# Patient Record
Sex: FEMALE | Race: WHITE
[De-identification: names, ages, dates, MRNs, and addresses within clinical notes are randomized per-mention and may not be internally consistent; named-entity substitution may affect disease eponyms.]

---

## 2019-07-01 ENCOUNTER — HOSPITAL ENCOUNTER (EMERGENCY)
Dept: HOSPITAL 97 - ER | Age: 64
Discharge: LEFT BEFORE DISCHARGE | End: 2019-07-01
Payer: COMMERCIAL

## 2019-07-01 DIAGNOSIS — Z53.21: Primary | ICD-10-CM

## 2019-07-01 PROCEDURE — 82962 GLUCOSE BLOOD TEST: CPT

## 2019-07-01 NOTE — XMS REPORT
Clinical Summary

 Created on:2019



Patient:Bessy Ryan

Sex:Female

:1955

External Reference #:[id number]





Demographics







 Address  [address] [place] [address]

 

 Home Phone  [phone]

 

 Preferred Language  English

 

 Marital Status  

 

 Baptism Affiliation  Unknown

 

 Race  

 

 Ethnic Group  Not  or 









Author







 Organization  [facility]

 

 Address  [address] [place] [address]









Support







 Name  Relationship  Address  Phone

 

 Autumn Ryan  Unavailable  Unavailable  [phone]

 

 Dean Forrester  Child  Unavailable  [phone]









Care Team Providers







 Name  Role  Phone

 

 Asked, No Pcp  Primary Care Provider  Unavailable









Allergies

No Known Allergies



Medications







 Medication  Sig  Dispensed  Refills  Start Date  End Date  Status

 

 albuterol (PROAIR  Inhale 2 puffs  1 Inhaler  0  2018  




 HFA,PROVENTIL  every 4 (four)          



 HFA,VENTOLIN HFA) 90  hours as needed          



 mcg/actuation  for wheezing for          



 inhaler  up to 30 days.          

 

 ondansetron (ZOFRAN)  Take 1 tablet (4  15 tablet  0  2018  




 4 MG tablet  mg total) by          



   mouth every 8          



   (eight) hours as          



   needed for nausea          



   for up to 5 days.          







Active Problems

Not on file



Encounters







 Date  Type  Specialty  Care Team  Description

 

 2018  Emergency  Emergency Medicine  Obi, Ann  Bronchitis (Primary Dx
)



       MD Liza  



after 2018



Social History







 Tobacco Use  Types  Packs/Day  Years Used  Date

 

 Current Some Day Smoker        









 Alcohol Use  Drinks/Week  oz/Week  Comments

 

 No      









 Sex Assigned at Birth  Date Recorded

 

 Not on file  









 Job Start Date  Occupation  Industry

 

 Not on file  Not on file  Not on file









 Travel History  Travel Start  Travel End









 No recent travel history available.







Last Filed Vital Signs







 Vital Sign  Reading  Time Taken

 

 Blood Pressure  133/71  2018  9:28 AM CST

 

 Pulse  78  2018  9:28 AM CST

 

 Temperature  36.3 C (97.4 F)  2018  5:40 AM CST

 

 Respiratory Rate  18  2018  9:28 AM CST

 

 Oxygen Saturation  98%  2018  9:28 AM CST

 

 Inhaled Oxygen Concentration  -  -

 

 Weight  72.6 kg (160 lb)  2018  5:40 AM CST

 

 Height  165.1 cm (5' 5")  2018  5:40 AM CST

 

 Body Mass Index  26.63  2018  5:40 AM CST







Plan of Treatment







 Health Maintenance  Due Date  Last Done  Comments

 

 BREAST CANCER SCREENING  2005    

 

 COLONOSCOPY SCREENING  2005    

 

 SHINGLES VACCINES (#1)  2005    

 

 INFLUENZA VACCINE  2019    







Procedures







 Procedure Name  Priority  Date/Time  Associated  Comments



       Diagnosis  

 

 ECG ED PRELIMINARY  Routine  2018  9:16    Results for this



 INTERPRETATION    AM CST    procedure are in



         the results



         section.

 

 ECG 12-LEAD  STAT  2018  6:56    Results for this



     AM CST    procedure are in



         the results



         section.

 

 XR CHEST 2 VW  STAT  2018  6:41    Results for this



     AM CST    procedure are in



         the results



         section.

 

 ESTIMATED GFR  STAT  2018  5:50    Results for this



     AM CST    procedure are in



         the results



         section.

 

 B NATRIURETIC PEPTIDE  STAT  2018  5:50    Results for this



     AM CST    procedure are in



         the results



         section.

 

 TROPONIN  STAT  2018  5:50    Results for this



     AM CST    procedure are in



         the results



         section.

 

 CREATINE KINASE, TOTAL  STAT  2018  5:50    Results for this



 (CPK)    AM CST    procedure are in



         the results



         section.

 

 COMPREHENSIVE METABOLIC  STAT  2018  5:50    Results for this



 PANEL    AM CST    procedure are in



         the results



         section.

 

 HC COMPLETE BLD COUNT  STAT  2018  5:50    Results for this



 W/AUTO DIFF    AM CST    procedure are in



         the results



         section.



after 2018



Results

ECG ED Preliminary Interpretation - NOT AN ORDER (2018  9:16 AM CST)





 Narrative  Performed At

 

 [place] [address]:31 AM



  



 ECG ED Preliminary Interpretation - Not an Order



  



 Performed by: ANN HELTON



  



 Authorized by: ANN HELTON 



  



  



  



 ECG reviewed by ED Physician in the absence of a cardiologist: yes



  



 Previous ECG: 



  



 Previous ECG:Unavailable



  



 Interpretation: 



  



 Interpretation: abnormal



  



 Rate: 



  



 ECG rate:78 bpm



  



 ECG rate assessment: normal



  



 Rhythm: 



  



 Rhythm: sinus rhythm



  



 Ectopy: 



  



 Ectopy: none



  



 QRS: 



  



 QRS axis:Left



  



 QRS intervals:Normal (106 ms)



  



 Conduction: 



  



 Conduction: abnormal



  



 Abnormal conduction: LAFB



  



 ST segments: 



  



 ST segments:Non-specific



  



 T waves: 



  



 T waves: normal



  



 Other findings: 



  



 Other findings: LVH



  



 Comments: 



  



  NV Interval: 166 ms



  



 QT/QTc: 428/487 ms



  



   



ECG 12 lead (2018  6:56 AM CST)





 Component  Value  Ref Range  Performed At  Pathologist Signature

 

 Ventricular rate  78    [facility]  

 

 Atrial rate  78    [facility]  

 

 NV interval  166    [facility]  

 

 QRSD interval  106    [facility]  

 

 QT interval  428    [facility]  

 

 QTC interval  487    [facility]  

 

 P axis 1  48    [facility]  

 

 QRS axis 1  -51    [facility]  

 

 T wave axis  59    [facility]  

 

 EKG impression  Normal sinus rhythm-Left anterior fascicular block-Moderate 
voltage criteria for LVH, may be normal variant-Abnormal ECG-In automated 
comparison with ECG of 13-MAR-2014 20:54,-Incomplete right bundle branch block 
is no longer present-Electronically    [facility]  



   Signed By Carrie Seymour ([id number]) on 2018 7:25:53 PM      



         









 Specimen

 

 









 Performing Organization  Address  [facility]  Phone Number

 

 [facility]  [address] [place] [address]  



XR Chest 2 Vw (2018  6:41 AM CST)





 Specimen

 

 









 Narrative  Performed At

 

 EXAMINATION:XR CHEST 2 VW



   RADIANT



  



  



 CLINICAL HISTORY:persistent coughr o pneumonia



  



  



  



 COMPARISON:To previous examination from 3/13/2014



  



  



  



 IMPRESSION:



  



  



  



 The heart is normal in appearance, and the lungs are clear.A moderate  



 hiatal hernia is again noted.



  



  



  



  



  



  



  



 [facility]-[id number]



  



   









 Procedure Note

 

 Hm Interface, Radiology Results Incoming - 2018  6:47 AM CST



EXAMINATION:  XR CHEST 2 VW



 



 CLINICAL HISTORY:  persistent cough  r o pneumonia



 



 COMPARISON:  To previous examination from 3/13/2014



 



 IMPRESSION:



 



 The heart is normal in appearance, and the lungs are clear.A moderate hiatal 
hernia is again noted.



 



 



 



 [facility]-[id number]









 Performing Organization  Address  City[facility]  Phone Number

 

 [facility]  [address] [place] [address]  



Estimated GFR (2018  5:50 AM CST)





 Component  Value  Ref Range  Performed At  Pathologist



         Signature

 

 Estimated GFR  >=90  mL/min/1.73  [facility] DEPARTMENT OF  



   Comment:  m2  PATHOLOGY AND  



   CatergoryUnitsInterpretation    GENOMIC MEDICINE  



   G1 >=90 Normal or high      



   G2 60-89Mildly decreased      



   [id number] to moderately decreased      



   [id number] to severely decreased      



   G4 15-29Severely decreased      



   G5 <15Kidney failure      



   The eGFR was calculated using the Chronic Kidney Disease      



   Epidemiology Collaboration (CKD-EPI) equation.      



   Interpretation is based on recommendations of the      



   National Kidney Foundation-Kidney Disease Outcomes Quality      



   Initiative (NKF-KDOQI) published in 2014.      



         









 Specimen

 

 Plasma specimen









 Performing Organization  Address  City/State/Zipcode  Phone Number

 

 [facility] DEPARTMENT OF PATHOLOGY AND  [address]  [place] [id number]  



 [facility]      



Troponin (2018  5:50 AM CST)





 Component  Value  Ref Range  Performed At  Pathologist



         Signature

 

 Troponin  <0.10  0.00 - 0.10  [facility] DEPARTMENT OF  



   Comment:  ng/mL  PATHOLOGY AND  



   0.11 - 1.49 ng/mlMay indicate increased risk of acute
    GENOMIC MEDICINE  



    coronary syndrome.
      



   >=1.5 ng/mlConsistent with acute myocardial      



    infarction.      



   
      



   The diagnostic value of a single normal or non-diagnostic      



   result is questionable.Serial samples at 2-6 hour intervals      



   are required to rule out acute myocardial injury.
      



         



         









 Specimen

 

 Plasma specimen









 Performing Organization  Address  City/[facility]  Phone Number

 

 [facility] DEPARTMENT OF PATHOLOGY AND  [address]  [place] [phone]  



 [facility]      



CBC with platelet and differential (2018  5:50 AM CST)





 Component  Value  Ref Range  Performed At  Pathologist



         Signature

 

 WBC  6.79  4.50 - 11.00  [facility] DEPARTMENT OF  



     k/uL  PATHOLOGY AND  



       GENOMIC MEDICINE  

 

 RBC  4.18 (L)  4.20 - 5.50  [facility] DEPARTMENT OF  



     m/uL  PATHOLOGY AND  



       GENOMIC MEDICINE  

 

 HGB  13.4  12.0 - 16.0  [facility] DEPARTMENT OF  



     g/dL  PATHOLOGY AND  



       GENOMIC MEDICINE  

 

 HCT  39.3  37.0 - 47.0 %  [facility] DEPARTMENT OF  



       PATHOLOGY AND  



       GENOMIC MEDICINE  

 

 MCV  94.0  82.0 - 100.0 fL  [facility] DEPARTMENT OF  



       PATHOLOGY AND  



       GENOMIC MEDICINE  

 

 MCH  32.1  27.0 - 34.0 pg  [facility] DEPARTMENT OF  



       PATHOLOGY AND  



       GENOMIC MEDICINE  

 

 MCHC  34.1  31.0 - 37.0  [facility] DEPARTMENT OF  



     g/dL  PATHOLOGY AND  



       GENOMIC MEDICINE  

 

 RDW - SD  43.2  37.0 - 55.0 fL  [facility] DEPARTMENT OF  



       PATHOLOGY AND  



       GENOMIC MEDICINE  

 

 MPV  10.3  8.8 - 13.2 fL  [facility] DEPARTMENT OF  



       PATHOLOGY AND  



       GENOMIC MEDICINE  

 

 Platelet count  302  150 - 400 k/uL  [facility] DEPARTMENT OF  



       PATHOLOGY AND  



       GENOMIC MEDICINE  

 

 Neutrophils  63.6  39.0 - 69.0 %  [facility] DEPARTMENT OF  



       PATHOLOGY AND  



       GENOMIC MEDICINE  

 

 Lymphocytes  24.0 (L)  25.0 - 45.0 %  [facility] DEPARTMENT OF  



       PATHOLOGY AND  



       GENOMIC MEDICINE  

 

 Monocytes  10.3 (H)  0.0 - 10.0 %  [facility] DEPARTMENT OF  



       PATHOLOGY AND  



       GENOMIC MEDICINE  

 

 Eosinophils  1.2  0.0 - 5.0 %  [facility] DEPARTMENT OF  



       PATHOLOGY AND  



       GENOMIC MEDICINE  

 

 Basophils  0.6  0.0 - 1.0 %  [facility] DEPARTMENT OF  



       PATHOLOGY AND  



       GENOMIC MEDICINE  

 

 Immature granulocytes  0.3  0.0 - 1.0 %  [facility] DEPARTMENT OF  



       PATHOLOGY AND  



       GENOMIC MEDICINE  









 Specimen

 

 Blood









 Performing Organization  Address  City/[facility]  Phone Number

 

 [facility] DEPARTMENT OF PATHOLOGY AND  [address] [address] [facility]  [address]      



B natriuretic peptide (2018  5:50 AM CST)





 Component  Value  Ref Range  Performed At  Pathologist Signature

 

 BNP  13  0 - 100 pg/mL  [facility] DEPARTMENT OF PATHOLOGY AND  



       GENOMIC MEDICINE  









 Specimen

 

 Blood









 Performing Organization  Address  City/[facility]  Phone Number

 

 [facility] DEPARTMENT OF PATHOLOGY AND  [address] [address] [facility] [facility].  [address]      



Creatine kinase, total (CPK) (2018  5:50 AM CST)





 Component  Value  Ref Range  Performed At  Pathologist Signature

 

 Creatine kinase  75  35 - 200 U/L  [facility] DEPARTMENT OF PATHOLOGY  



       AND GENOMIC MEDICINE  









 Specimen

 

 Plasma specimen









 Performing Organization  Address  City/[facility]  Phone Number

 

 [facility] DEPARTMENT OF PATHOLOGY AND  [address] [address] [facility] [facility].  [address]      



Comprehensive metabolic panel (2018  5:50 AM CST)





 Component  Value  Ref Range  Performed At  Pathologist



         Signature

 

 Sodium  138  135 - 148  [facility] DEPARTMENT OF  



     mEq/L  PATHOLOGY AND  



       GENOMIC MEDICINE  

 

 Potassium  3.0 (LL)  3.5 - 5.0  [facility] DEPARTMENT OF  



   Comment:  mEq/L  PATHOLOGY AND  



   POTASSIUM results called to and read back by ALISSA UMANA/KARLENE 2018    GENOMIC MEDICINE  



   08:54 by JUDY.      



         

 

 Chloride  98 (L)  99 - 109  [facility] DEPARTMENT OF  



     mEq/L  PATHOLOGY AND  



       GENOMIC MEDICINE  

 

 CO2  24  24 - 31 mEq/L  [facility] DEPARTMENT OF  



       PATHOLOGY AND  



       GENOMIC MEDICINE  

 

 Anion gap  16@ANIO (H)  7 - 15 mEq/L  [facility] DEPARTMENT OF  



       PATHOLOGY AND  



       GENOMIC MEDICINE  

 

 BUN  24  8 - 24 mg/dL  [facility] DEPARTMENT OF  



       PATHOLOGY AND  



       GENOMIC MEDICINE  

 

 Creatinine  0.54  0.50 - 0.90  [facility] DEPARTMENT OF  



     mg/dL  PATHOLOGY AND  



       GENOMIC MEDICINE  

 

 Glucose  165 (H)  65 - 99 mg/dL  [facility] DEPARTMENT OF  



       PATHOLOGY AND  



       GENOMIC MEDICINE  

 

 Calcium  9.1  8.6 - 10.6  [facility] DEPARTMENT OF  



     mg/dL  PATHOLOGY AND  



       GENOMIC MEDICINE  

 

 Protein  7.1  6.3 - 8.2  [facility] DEPARTMENT OF  



     g/dL  PATHOLOGY AND  



       GENOMIC MEDICINE  

 

 Albumin  3.6  3.5 - 5.0  [facility] DEPARTMENT OF  



     g/dL  PATHOLOGY AND  



       GENOMIC MEDICINE  

 

 A/G ratio  1.0  0.7 - 3.8  [facility] DEPARTMENT OF  



       PATHOLOGY AND  



       GENOMIC MEDICINE  

 

 Alkaline  79  30 - 115 U/L  [facility] DEPARTMENT OF  



 phosphatase      PATHOLOGY AND  



       GENOMIC MEDICINE  

 

 AST  29  15 - 46 U/L  [facility] DEPARTMENT OF  



       PATHOLOGY AND  



       GENOMIC MEDICINE  

 

 ALT  39  10 - 55 U/L  [facility] DEPARTMENT OF  



       PATHOLOGY AND  



       GENOMIC MEDICINE  

 

 Total bilirubin  0.5  0.2 - 1.2  [facility] DEPARTMENT OF  



     mg/dL  PATHOLOGY AND  



       GENOMIC MEDICINE  









 Specimen

 

 Plasma specimen









 Performing Organization  Address  City/State/[facility]  Phone Number

 

 [facility] DEPARTMENT OF PATHOLOGY AND  [address] [place] [address]  



 GENOMIC MEDICINE      



after 2018



Insurance







 Payer  Benefit Plan / Group  Subscriber ID  Effective Dates  Phone  Address  
Type

 

 [employer] [place]  xxxxxxxxx  2018-Present      O



   [employer]          









 Guarantor Name  Account Type  Relation to  Date of Birth  Phone  Billing



     Patient      Address

 

 Bessy Ryan  Personal/Family  Self  1955  [phone] [address]



         (Home)  [place] [address]







Advance Directives

Patient has advance care planning documents on file. For more information, 
please contact:Que Lundberg[address] [place] [address]

## 2019-07-01 NOTE — XMS REPORT
Patient Summary Document

 Created on:2019



Patient:ARIA LEES

Sex:Female

:1955

External Reference #:[id number]





Demographics







 Address  [address] [place] [address]

 

 Home Phone  [phone]

 

 Preferred Language  Unknown

 

 Marital Status  Unknown

 

 Mandaen Affiliation  Unknown

 

 Race  Unknown

 

 Ethnic Group  Unknown









Author







 Organization  [facility]

 

 Address  [address] [place] [address] Dr. Lopez [address] [place] [address]

 

 Phone  [phone]









Care Team Providers







 Name  Role  Phone

 

 Unavailable  Unavailable  Unavailable









Payers







 Payer Name  Policy Type  Policy Number  Effective Date  Expiration Date







Problems

This patient has no known problems.



Allergies, Adverse Reactions, Alerts

This patient has no known allergies or adverse reactions.



Medications

This patient has no known medications.

## 2019-07-01 NOTE — ER
Nurse's Notes                                                                                     

 [facility]                                                                 

Name: Bessy Ryan                                                                           

Age: 64 yrs                                                                                       

Sex: Female                                                                                       

: 1955                                                                                   

MRN: [id number]                                                                                   

Arrival Date: 2019                                                                          

Time: 19:54                                                                                       

Account#: [id number]                                                                            

Bed 13                                                                                            

Private MD:                                                                                       

Diagnosis:                                                                                        

                                                                                                  

Presentation:                                                                                     

                                                                                             

20:09 Presenting complaint: Patient states: "I used my home Glucometer and it read over       jd3 

      500.". Transition of care: patient was not received from another setting of care. Onset     

      of symptoms was 2019. Risk Assessment: Do you want to hurt yourself or someone     

      else? Patient reports no desire to harm self or others. Initial Sepsis Screen: Does the     

      patient meet any 2 criteria? No. Patient's initial sepsis screen is negative. Does the      

      patient have a suspected source of infection? No. Patient's initial sepsis screen is        

      negative. Care prior to arrival: None.                                                      

20:09 Method Of Arrival: Ambulatory                                                           jd3 

20:09 Acuity: RACHEL 3                                                                           jd3 

                                                                                                  

Historical:                                                                                       

- Allergies:                                                                                      

20:14 No Known Allergies;                                                                     jd3 

- Home Meds:                                                                                      

20:14 glimepiride 4 mg Oral tab 1 tab twice a day [Active]; amlodipine oral [Active];         jd3 

      citalopram oral [Active]; gabapentin oral oral [Active]; Hydrochlorothiazide Oral           

      [Active]; Naproxen Oral [Active]; Omeprazole Oral [Active]; rosuvastatin oral oral          

      [Active];                                                                                   

- PMHx:                                                                                           

20:14 Diabetes - NIDDM; Hypertension; High Cholesterol;                                       jd3 

- PSHx:                                                                                           

20:14 Hysterectomy;                                                                           jd3 

                                                                                                  

- Immunization history:: Adult Immunizations unknown.                                             

- Social history:: Smoking status: Patient uses tobacco products, smokes one-half pack            

  cigarettes per day.                                                                             

- Ebola Screening: : Patient negative for fever greater than or equal to 101.5 degrees            

  Fahrenheit, and additional compatible Ebola Virus Disease symptoms.                             

                                                                                                  

                                                                                                  

Screenin:00 Abuse screen: Denies threats or abuse. Nutritional screening: No deficits noted.        jb4 

      Tuberculosis screening: No symptoms or risk factors identified. Fall Risk None              

      identified.                                                                                 

                                                                                                  

Assessment:                                                                                       

21:00 General: Appears in no apparent distress. comfortable, Behavior is calm, cooperative,   jb4 

      appropriate for age. Pain: Denies pain. Neuro: Level of Consciousness is awake, alert,      

      obeys commands, Oriented to person, place, time, situation. Cardiovascular: Patient's       

      skin is warm and dry. Respiratory: Airway is patent Respiratory effort is even,             

      unlabored, Respiratory pattern is regular, symmetrical. GI: No signs and/or symptoms        

      were reported involving the gastrointestinal system. : No signs and/or symptoms were      

      reported regarding the genitourinary system. EENT: No signs and/or symptoms were            

      reported regarding the EENT system. Derm: Skin is intact, Skin is pink, warm \T\ dry.       

      Musculoskeletal: Circulation, motion, and sensation intact.                                 

                                                                                                  

Vital Signs:                                                                                      

20:15  / 76; Pulse 92; Resp 16 S; Temp 97.6(O); Pulse Ox 98% on R/A; Weight 83.91 kg    jd3 

      (R); Height 5 ft. 4 in. (162.56 cm) (R); Pain 0/10;                                         

21:00  / 67; Pulse 83; Resp 16; Pulse Ox 97% on R/A;                                    jb4 

20:15 Body Mass Index 31.75 (83.91 kg, 162.56 cm)                                             jd3 

                                                                                                  

ED Course:                                                                                        

19:54 Patient arrived in ED.                                                                  es  

20:11 Triage completed.                                                                       jd3 

20:15 Arm band placed on.                                                                     jd3 

21:00 Patient has correct armband on for positive identification. Bed in low position. Call   jb4 

      light in reach. Side rails up X 1. Pulse ox on. NIBP on.                                    

21:15 No provider procedures requiring assistance completed. Patient did not have IV access   jb4 

      during this emergency room visit.                                                           

                                                                                                  

Administered Medications:                                                                         

No medications were administered                                                                  

                                                                                                  

                                                                                                  

Point of Care Testing:                                                                            

      Blood Glucose:                                                                              

20:15 Blood Glucose: 300 mg/dL;                                                               jd3 

      Ranges:                                                                                     

                                                                                                  

Outcome:                                                                                          

21:15 Eloped before seeing physician                                                          jb4 

21:17 Patient left the ED.                                                                    jb4 

                                                                                                  

Signatures:                                                                                       

Radha Sanches James RN                       RN   jb4                                                  

Mars Stephenson RN RN   jd3                                                  

                                                                                                  

**************************************************************************************************

## 2019-11-13 ENCOUNTER — HOSPITAL ENCOUNTER (EMERGENCY)
Dept: HOSPITAL 97 - ER | Age: 64
Discharge: HOME | End: 2019-11-13
Payer: COMMERCIAL

## 2019-11-13 VITALS — OXYGEN SATURATION: 97 % | SYSTOLIC BLOOD PRESSURE: 122 MMHG | DIASTOLIC BLOOD PRESSURE: 76 MMHG

## 2019-11-13 DIAGNOSIS — R07.9: Primary | ICD-10-CM

## 2019-11-13 DIAGNOSIS — F17.210: ICD-10-CM

## 2019-11-13 LAB
BUN BLD-MCNC: 22 MG/DL (ref 7–18)
GLUCOSE SERPLBLD-MCNC: 204 MG/DL (ref 74–106)
HCT VFR BLD CALC: 43.7 % (ref 36–45)
LYMPHOCYTES # SPEC AUTO: 1.5 K/UL (ref 0.7–4.9)
NT-PROBNP SERPL-MCNC: 49 PG/ML (ref ?–125)
PMV BLD: 9.4 FL (ref 7.6–11.3)
POTASSIUM SERPL-SCNC: 3.7 MMOL/L (ref 3.5–5.1)
RBC # BLD: 4.6 M/UL (ref 3.86–4.86)
TROPONIN (EMERG DEPT USE ONLY): < 0.02 NG/ML (ref 0–0.04)

## 2019-11-13 PROCEDURE — 83880 ASSAY OF NATRIURETIC PEPTIDE: CPT

## 2019-11-13 PROCEDURE — 85025 COMPLETE CBC W/AUTO DIFF WBC: CPT

## 2019-11-13 PROCEDURE — 71045 X-RAY EXAM CHEST 1 VIEW: CPT

## 2019-11-13 PROCEDURE — 99285 EMERGENCY DEPT VISIT HI MDM: CPT

## 2019-11-13 PROCEDURE — 80048 BASIC METABOLIC PNL TOTAL CA: CPT

## 2019-11-13 PROCEDURE — 84484 ASSAY OF TROPONIN QUANT: CPT

## 2019-11-13 PROCEDURE — 36415 COLL VENOUS BLD VENIPUNCTURE: CPT

## 2019-11-13 PROCEDURE — 93005 ELECTROCARDIOGRAM TRACING: CPT

## 2019-11-13 NOTE — XMS REPORT
Patient Summary Document

 Created on:2019



Patient:ARIA LEES

Sex:Female

:1955

External Reference #:[id number]





Demographics







 Address  [address]



   [place] [address]

 

 Home Phone  [phone]

 

 Work Phone  [phone]

 

 Email Address  [email or web address]

 

 Preferred Language  Unknown

 

 Marital Status  Unknown

 

 [employer] Affiliation  Unknown

 

 Race  Unknown

 

 Additional Race(s)  Unavailable

 

 Ethnic Group  Unknown









Author







 Organization  [facility]

 

 Address  [address]



   [place] [address]

 

 Phone  [phone]









Support







 Name  Relationship  Address  Phone

 

 S, A  Unavailable  [address]  [phone]



     [place] [address]  

 

 S, A  Unavailable  [address]  [phone]



     [place] [address]  

 

 ARIA PETERS  Unavailable  [address]  [phone]



     [place] [address]  









Care Team Providers







 Name  Role  Phone

 

 Unavailable  Unavailable  Unavailable









Payers







 Payer Name  Policy Type  Policy Number  Effective Date  Expiration Date







Problems

This patient has no known problems.



Allergies, Adverse Reactions, Alerts

This patient has no known allergies or adverse reactions.



Medications

This patient has no known medications.

## 2019-11-13 NOTE — ER
Nurse's Notes                                                                                     

 [facility]                                                                 

Name: Bessy Ryan                                                                           

Age: 64 yrs                                                                                       

Sex: Female                                                                                       

: 1955                                                                                   

MRN: [id number]                                                                                   

Arrival Date: 2019                                                                          

Time: 09:17                                                                                       

Account#: [id number]                                                                            

Bed 5                                                                                             

Private MD:                                                                                       

Diagnosis: Chest pain, unspecified                                                                

                                                                                                  

Presentation:                                                                                     

                                                                                             

09:25 Presenting complaint: Patient states: sharp intermittent chest pain under left breast   sv  

      that radiates to the left shoulder and arm that lasts about a minute, SOB started           

      yesterday. Denies n/v/d/fever/cough. Transition of care: patient was not received from      

      another setting of care. Onset of symptoms was 2019. Risk Assessment: Do       

      you want to hurt yourself or someone else? Patient reports no desire to harm self or        

      others. Initial Sepsis Screen: Does the patient meet any 2 criteria? No. Patient's          

      initial sepsis screen is negative. Does the patient have a suspected source of              

      infection? No. Patient's initial sepsis screen is negative. Care prior to arrival: None.    

09:25 Method Of Arrival: Wheelchair                                                           sv  

09:25 Acuity: RACHEL 3                                                                           sv  

                                                                                                  

Triage Assessment:                                                                                

09:25 General: Appears in no apparent distress. uncomfortable, well groomed, well developed,  sv  

      Behavior is calm, cooperative, appropriate for age. Pain: Complains of pain in left         

      breast Pain radiates to left arm and left shoulder Pain currently is 6 out of 10 on a       

      pain scale. Quality of pain is described as sharp, Pain began 1 day ago. Is                 

      intermittent, Alleviated by nothing. Aggravated by anything Also complains of shortness     

      of breath. Neuro: Level of Consciousness is awake, alert, obeys commands, Oriented to       

      person, place, time, situation, Moves all extremities. Full function Gait is steady.        

      Cardiovascular: Patient's skin is warm and dry. Rhythm is sinus rhythm. Respiratory:        

      Airway is patent Respiratory effort is even, unlabored, Respiratory pattern is regular,     

      symmetrical. GI: Patient currently denies diarrhea, nausea, vomiting. Derm: Skin is         

      pink, warm \T\ dry. Musculoskeletal: Range of motion: intact in all extremities.            

                                                                                                  

Historical:                                                                                       

- Allergies:                                                                                      

09:35 No Known Allergies;                                                                     sv  

- PMHx:                                                                                           

09:35 Diabetes - NIDDM; High Cholesterol; Hypertension;                                       sv  

- PSHx:                                                                                           

09:35 Hysterectomy;                                                                           sv  

                                                                                                  

- Immunization history:: Flu vaccine is not up to date.                                           

- Social history:: Smoking status: Patient uses tobacco products, smokes one pack                 

  cigarettes per day. Patient/guardian denies using alcohol.                                      

- Ebola Screening: : No symptoms or risks identified at this time.                                

- Family history:: not pertinent, Mother has/had MI.                                              

- Hospitalizations: : No recent hospitalization is reported.                                      

                                                                                                  

                                                                                                  

Screenin:37 Abuse screen: Denies threats or abuse. Denies injuries from another. Nutritional        sv  

      screening: No deficits noted. Tuberculosis screening: No symptoms or risk factors           

      identified. Fall Risk None identified.                                                      

                                                                                                  

Assessment:                                                                                       

10:34 Reassessment: Patient appears in no apparent distress at this time. No changes from     sv  

      previously documented assessment. Patient and/or family updated on plan of care and         

      expected duration. Pain level reassessed. Patient is alert, oriented x 3, equal             

      unlabored respirations, skin warm/dry/pink.                                                 

11:20 Reassessment: Patient appears in no apparent distress at this time. Patient and/or      ph  

      family updated on plan of care and expected duration. Pain level reassessed. Patient is     

      alert, oriented x 3, equal unlabored respirations, skin warm/dry/pink. Pt resting           

      comfortably, repeat cardiac enzymes drawn and sent to lab, awaiting results.                

12:21 Reassessment: Patient appears in no apparent distress at this time. No changes from     sv  

      previously documented assessment. Patient and/or family updated on plan of care and         

      expected duration. Pain level reassessed. Patient is alert, oriented x 3, equal             

      unlabored respirations, skin warm/dry/pink.                                                 

                                                                                                  

Vital Signs:                                                                                      

09:36  / 77; Pulse 80 MON; Resp 16; Pulse Ox 96% ; Weight 81.65 kg; Height 5 ft. 4 in.  sv  

      (162.56 cm); Pain 6/10;                                                                     

10:22  / 78; Pulse 74; Resp 19; Pulse Ox 95% on R/A;                                    sv  

11:00  / 68; Pulse 69; Resp 13; Pulse Ox 100% ;                                         sv  

12:20  / 76; Pulse 68; Resp 15; Pulse Ox 97% ;                                          sv  

09:36 Body Mass Index 30.90 (81.65 kg, 162.56 cm)                                             sv  

09:36 Sinus Rhythm                                                                            sv  

                                                                                                  

ED Course:                                                                                        

09:17 Patient arrived in ED.                                                                  rg4 

09:18 Leatha Bautista RN is Primary Nurse.                                                  sv  

09:18 Alexandro Freeman MD is Attending Physician.                                                rn  

09:25 Arm band placed on Patient placed in an exam room, on a stretcher.                      sv  

09:25 Patient has correct armband on for positive identification. Placed in gown. Bed in low  sv  

      position. Call light in reach. Side rails up X 1. Cardiac monitor on. Pulse ox on. NIBP     

      on. Door closed. Warm blanket given. Head of bed elevated.                                  

09:30 Inserted saline lock: 20 gauge in right antecubital area, using aseptic technique.      sv  

      Blood collected. Flushed right antecubital with 5 ml normal saline. Patient maintains       

      SpO2 saturation greater than 95% on room air.                                               

09:35 Triage completed.                                                                       sv  

09:58 XRAY Chest (1 view) In Process Unspecified.                                             EDMS

10:32 Awaiting radiology results.                                                             sv  

11:18 Repeat lab(s) drawn. by ED staff, sent to lab.                                          sv  

12:20 No provider procedures requiring assistance completed. IV discontinued, intact,         sv  

      bleeding controlled, No redness/swelling at site. Pressure dressing applied.                

19:01 Primary Nurse role handed off by Leatha Bautista RN                                   sv  

                                                                                                  

Administered Medications:                                                                         

No medications were administered                                                                  

                                                                                                  

                                                                                                  

Outcome:                                                                                          

11:59 Discharge ordered by MD.                                                                rn  

12:21 Discharged to home ambulatory.                                                          sv  

12:21 Condition: stable                                                                           

12:21 Discharge instructions given to patient, Instructed on discharge instructions, follow       

      up and referral plans. Demonstrated understanding of instructions, follow-up care.          

12:21 Patient left the ED.                                                                    sv  

                                                                                                  

Signatures:                                                                                       

Dispatcher [employer]                           EDMS                                                 

Leatha Bautista RN RN                                                      

Alexandro Freeman MD MD rn Hall, Patricia, RN RN                                                      

Radha Tillman                                 rg4                                                  

                                                                                                  

Corrections: (The following items were deleted from the chart)                                    

10:22 09:36  / 77; Pulse 80bpm; Resp 16bpm; Pulse Ox 96%; 81.65 kg; Height 5 ft. 4 in.; sv  

      BMI: 30.9; Pain 6/10; sv                                                                    

                                                                                                  

**************************************************************************************************

## 2019-11-13 NOTE — EDPHYS
Physician Documentation                                                                           

 [facility]                                                                 

Name: Bessy Ryan                                                                           

Age: 64 yrs                                                                                       

Sex: Female                                                                                       

: 1955                                                                                   

MRN: [id number]                                                                                   

Arrival Date: 2019                                                                          

Time: 09:17                                                                                       

Account#: [id number]                                                                            

Bed 5                                                                                             

Private MD:                                                                                       

ED Physician Alexandro Freeman                                                                         

HPI:                                                                                              

                                                                                             

09:27 This 64 yrs old  Female presents to ER via Unassigned with complaints of Chest rn  

      Pain.                                                                                       

09:27 The patient or guardian reports chest pain that is located primarily in the anterior    rn  

      chest wall, left. Onset: yesterday. The pain radiates to the left arm, the left             

      shoulder. The chest pain is described as sharp. Duration: The patient or guardian           

      reports multiple episodes, that are intermittent. Modifying factors: The symptoms are       

      alleviated by nothing. the symptoms are aggravated by nothing. Severity of pain: At its     

      worst the pain was moderate in the emergency department the pain has improved. The          

      patient has not experienced similar symptoms in the past. Reports has been in middle of     

      cardiac w/u by pcp, denies recent chest pain episodes but since yesterday has had           

      intermittent left sided chest pain, not worse by anything. No trauma, no fever, +           

      smoker with typical cough but nothing new other than pain..                                 

                                                                                                  

Historical:                                                                                       

- Allergies:                                                                                      

09:35 No Known Allergies;                                                                     sv  

- PMHx:                                                                                           

09:35 Diabetes - NIDDM; High Cholesterol; Hypertension;                                       sv  

- PSHx:                                                                                           

09:35 Hysterectomy;                                                                           sv  

                                                                                                  

- Immunization history:: Flu vaccine is not up to date.                                           

- Social history:: Smoking status: Patient uses tobacco products, smokes one pack                 

  cigarettes per day. Patient/guardian denies using alcohol.                                      

- Ebola Screening: : No symptoms or risks identified at this time.                                

- Family history:: not pertinent, Mother has/had MI.                                              

- Hospitalizations: : No recent hospitalization is reported.                                      

                                                                                                  

                                                                                                  

ROS:                                                                                              

09:27 Constitutional: Negative for fever, chills, and weight loss, Eyes: Negative for injury, rn  

      pain, redness, and discharge, Cardiovascular: Negative for palpitations, and edema,         

      Respiratory: Negative for shortness of breath, cough, wheezing, and pleuritic chest         

      pain, Abdomen/GI: Negative for abdominal pain, nausea, vomiting, diarrhea, and              

      constipation, MS/Extremity: Negative for injury and deformity, Skin: Negative for           

      injury, rash, and discoloration, Neuro: Negative for headache, weakness, numbness,          

      tingling, and seizure.                                                                      

                                                                                                  

Exam:                                                                                             

09:26 ECG was reviewed by the Attending Physician.                                            rn  

09:27 Constitutional:  This is a well developed, well nourished patient who is awake, alert,  rn  

      and in no acute distress. Head/Face:  Normocephalic, atraumatic. Eyes:  Pupils equal        

      round and reactive to light, extra-ocular motions intact. Cardiovascular:  Regular rate     

      and rhythm.  No pulse deficits. Respiratory:  No increased work of breathing, no            

      retractions or nasal flaring. Abdomen/GI:  Soft, non-tender.  No evidence of tenderness     

      throughout. MS/ Extremity:  Pulses equal, no cyanosis.  Neurovascular intact.  Full,        

      normal range of motion.  Equal circumference. Neuro:  Awake and alert, GCS 15, oriented     

      to person, place, time, and situation.  Cranial nerves II-XII grossly intact.  Motor        

      strength 5/5 in all extremities.  Sensory grossly intact.  Cerebellar exam normal.          

                                                                                                  

Vital Signs:                                                                                      

09:36  / 77; Pulse 80 MON; Resp 16; Pulse Ox 96% ; Weight 81.65 kg; Height 5 ft. 4 in.  sv  

      (162.56 cm); Pain 6/10;                                                                     

10:22  / 78; Pulse 74; Resp 19; Pulse Ox 95% on R/A;                                    sv  

11:00  / 68; Pulse 69; Resp 13; Pulse Ox 100% ;                                         sv  

12:20  / 76; Pulse 68; Resp 15; Pulse Ox 97% ;                                          sv  

09:36 Body Mass Index 30.90 (81.65 kg, 162.56 cm)                                             sv  

09:36 Sinus Rhythm                                                                            sv  

                                                                                                  

MDM:                                                                                              

09:18 Patient medically screened.                                                             rn  

10:42 Differential diagnosis: acute myocardial infarction, acute pericarditis, anxiety,       rn  

      coronary artery disease chest wall pain, costochondritis, esophagitis, gastritis,           

      gastroesophageal reflux disease (GERD), pleurisy, pneumonia, pneumothorax, stable           

      angina. Data reviewed: vital signs, nurses notes, lab test result(s), EKG, radiologic       

      studies, plain films, and as a result, I will admit patient. Refusal of service: The        

      patient/guardian displays adequate decision making capability and despite a detailed        

      discussion of alternatives, benefits, risks, and consequences refuses: Admission to the     

      hospital for further work-up and treatment. ED course: Pt refuses admission, discussed      

      safe option would be to admit for stress test given we do not have etiology of chest        

      pain worked out, and has multiple cardiac risk factors. No acute findings in                

      blood/ecg/cxr. Patient will stay for repeat trop. States has outpt stress scheduled and     

      will get outpt. Return precautions given and understood. .                                  

11:58 ED course: Repeat trop neg, again, patient wants to go home and complete outpt cardiac  rn  

      workup as already scheduled. .                                                              

                                                                                                  

                                                                                             

09:40 Order name: Basic Metabolic Panel; Complete Time: 10:17                                 rn  

                                                                                             

09:40 Order name: CBC with Diff; Complete Time: 10:17                                         rn  

                                                                                             

09:40 Order name: NT PRO-BNP; Complete Time: 10:                                            rn  

                                                                                             

09:40 Order name: Troponin (emerg Dept Use Only); Complete Time: 10:17                        rn  

                                                                                             

09:40 Order name: XRAY Chest (1 view); Complete Time: 10:57                                   rn  

                                                                                             

11:07 Order name: Troponin (emerg Dept Use Only): draw at 11:30; Complete Time: 11:58         rn  

                                                                                             

09:40 Order name: EKG; Complete Time: 09:41                                                   rn  

                                                                                             

09:40 Order name: Cardiac monitoring; Complete Time: 09:59                                    rn  

                                                                                             

09:40 Order name: EKG - Nurse/Tech; Complete Time: 09:59                                      rn  

                                                                                             

09:40 Order name: IV Saline Lock; Complete Time: 09:59                                        rn  

                                                                                             

09:40 Order name: Labs collected and sent; Complete Time: 10:00                               rn  

                                                                                             

09:40 Order name: O2 Per Protocol; Complete Time: 10:00                                       rn  

                                                                                             

09:40 Order name: O2 Sat Monitoring; Complete Time: 10:00                                     rn  

                                                                                                  

EC:26 Rate is 80 beats/min. Left axis deviation noted. QRS is positive in lead I and negative rn  

      in lead aVF. DC interval is normal. QRS interval is normal. QT interval is normal. No Q     

      waves. T waves are Normal. No ST changes noted. Clinical impression: NSR w/                 

      Non-specific ST/T Changes. Interpreted by me. Reviewed by me.                               

                                                                                                  

Administered Medications:                                                                         

No medications were administered                                                                  

                                                                                                  

                                                                                                  

Disposition:                                                                                      

19 11:59 Discharged to Home. Impression: Chest pain, unspecified.                           

- Condition is Stable.                                                                            

- Discharge Instructions: Nonspecific Chest Pain, Pharmacologic Stress Echocardiogram.            

                                                                                                  

- Medication Reconciliation Form, Thank You Letter, Antibiotic Education, Prescription            

  Opioid Use form.                                                                                

- Follow up: Private Physician; When: 1 - 2 days; Reason: Recheck today's complaints,             

  Re-evaluation by your physician.                                                                

- Problem is new.                                                                                 

- Symptoms have improved.                                                                         

                                                                                                  

                                                                                                  

                                                                                                  

Signatures:                                                                                       

Dispatcher [employer]                           EDLeatha Peoples RN                    RN                                                      

Alexandro Freeman MD MD   rn                                                   

                                                                                                  

Corrections: (The following items were deleted from the chart)                                    

12:21 11:59 2019 11:59 Discharged to Home. Impression: Chest pain, unspecified.         sv  

      Condition is Stable. Forms are Medication Reconciliation Form, Thank You Letter,            

      Antibiotic Education, Prescription Opioid Use. Follow up: Private Physician; When: 1 -      

      2 days; Reason: Recheck today's complaints, Re-evaluation by your physician. Problem is     

      new. Symptoms have improved. rn                                                             

                                                                                                  

**************************************************************************************************

## 2019-11-13 NOTE — RAD REPORT
EXAM DESCRIPTION:  Mario Single View11/13/2019 9:57 am

 

CLINICAL HISTORY:  Chest pain

 

COMPARISON:  none

 

FINDINGS:   The lungs appear clear of acute infiltrate. The heart is normal size

 

Moderate hiatal hernia

 

IMPRESSION:   No acute abnormalities displayed